# Patient Record
Sex: MALE | Race: WHITE | NOT HISPANIC OR LATINO | Employment: FULL TIME | URBAN - METROPOLITAN AREA
[De-identification: names, ages, dates, MRNs, and addresses within clinical notes are randomized per-mention and may not be internally consistent; named-entity substitution may affect disease eponyms.]

---

## 2019-07-09 ENCOUNTER — OFFICE VISIT (OUTPATIENT)
Dept: FAMILY MEDICINE CLINIC | Facility: CLINIC | Age: 24
End: 2019-07-09
Payer: COMMERCIAL

## 2019-07-09 VITALS
WEIGHT: 171 LBS | SYSTOLIC BLOOD PRESSURE: 116 MMHG | OXYGEN SATURATION: 100 % | DIASTOLIC BLOOD PRESSURE: 74 MMHG | RESPIRATION RATE: 18 BRPM | HEART RATE: 96 BPM

## 2019-07-09 DIAGNOSIS — Z20.2 POSSIBLE EXPOSURE TO STD: Primary | ICD-10-CM

## 2019-07-09 PROCEDURE — 1036F TOBACCO NON-USER: CPT | Performed by: FAMILY MEDICINE

## 2019-07-09 PROCEDURE — 99213 OFFICE O/P EST LOW 20 MIN: CPT | Performed by: FAMILY MEDICINE

## 2019-07-09 NOTE — PROGRESS NOTES
ADULT ANNUAL PHYSICAL  [facility] PRACTICE    NAME: Edgar Flores  AGE: 21 y o  SEX: male  : 1995     DATE: 2019     Assessment and Plan:     Problem List Items Addressed This Visit     None      Visit Diagnoses     Possible exposure to STD    -  Primary    Relevant Orders    Trichomonas vaginalis RNA Qual TMA, Males          Counseling:  Alcohol/drug use: discussed moderation in alcohol intake and avoidance of illicit drug use  Dental Health: discussed importance of regular tooth brushing, flossing, and dental visits  · Sexual health: discussed sexually transmitted diseases, partner selection, use of condoms, avoidance of unintended pregnancy, and contraceptive alternatives  No follow-ups on file  Chief Complaint:     Chief Complaint   Patient presents with    Establish Care      History of Present Illness:     Adult Annual Physical   Patient here for a comprehensive physical exam and to establish care  He has not had an annual physical in >1 year  Patient moved to [address] in  from [facility] and would like to establish care with a PCP  He endorses that his girlfriend was recently seen in the emergency room for possible sexually transmitted disease  He was advised by Dr Antonino Vasquez to come to the office to be tested  Upon review of his girlfriend's chart, she was found to have tested negative for chlamydia/gonorrhea and the results of her Trichomonas testing were lost   She was treated prophylactically with metronidazole, IM ceftriaxone and po Azithromycin  Patient denies any burning on urination, penile discharge and genital lesions  He has no complaints today  Diet and Physical Activity  · Diet/Nutrition: well balanced diet  · Exercise: 5-7 times a week on average and 30-60 minutes on average        Depression Screening  PHQ-9 Depression Screening    PHQ-9:    Frequency of the following problems over the past two weeks: General Health  · Sleep: gets 7-8 hours of sleep on average  · Hearing: normal - bilateral   · Vision: goes for regular eye exams, most recent eye exam <1 year ago and wears glasses and contacts  · Dental: regular dental visits, brushes teeth twice daily and flosses teeth occasionally   Health  · History of STDs?: no      Review of Systems:     Review of Systems   Constitutional: Negative for activity change, appetite change, chills, fatigue and fever  HENT: Negative for congestion, ear pain, rhinorrhea, sinus pressure and sinus pain  Respiratory: Negative for cough, shortness of breath and wheezing  Cardiovascular: Negative for chest pain, palpitations and leg swelling  Gastrointestinal: Negative for abdominal pain, constipation, diarrhea, nausea and vomiting  Endocrine: Negative for heat intolerance, polydipsia and polyuria  Genitourinary: Negative for decreased urine volume, difficulty urinating, discharge, flank pain, penile pain, testicular pain and urgency  Musculoskeletal: Negative for arthralgias, back pain, joint swelling, myalgias, neck pain and neck stiffness  Skin: Negative for color change, rash and wound  Allergic/Immunologic: Negative for environmental allergies and food allergies  Neurological: Negative for dizziness, syncope, weakness, light-headedness, numbness and headaches  Hematological: Does not bruise/bleed easily  Psychiatric/Behavioral: Negative for confusion, decreased concentration, dysphoric mood, sleep disturbance and suicidal ideas  The patient is not nervous/anxious         Past Medical History:     Past Medical History:   Diagnosis Date    Anxiety     Fracture of lower end of both ulna and radius       Past Surgical History:     Past Surgical History:   Procedure Laterality Date    WISDOM TOOTH EXTRACTION N/A N/A       Social History:     Social History     Socioeconomic History    Marital status: Single     Spouse name:     Number of children: 0    Years of education: 12    Highest education level: College   Occupational History       Social Needs    Financial resource strain: None    Food insecurity:     Worry: None     Inability: None    Transportation needs:     Medical: None     Non-medical: None   Tobacco Use    Smoking status: Never smoker   Substance and Sexual Activity    Alcohol use: Social    Drug use: None    Sexual activity: Sexually active with girlfriend   Lifestyle    Physical activity:     Days per week: 5-7     Minutes per session: 60 minutes    Stress: Work, school   Relationships    Social connections:     Talks on phone: Yes     Gets together: Yes     Attends Sikh service: No     Active member of club or organization: No     Attends meetings of clubs or organizations: No     Relationship status: Girlfriend    Intimate partner violence:     Fear of current or ex partner: No     Emotionally abused: No     Physically abused: No     Forced sexual activity: No   Other Topics Concern    Not on file   Social History Narrative    Not on file      Family History:     Family History   Problem Relation Age of Onset    Diabetes Maternal Grandfather     Pancreatic cancer Maternal Grandfather     Heart failure Paternal Grandfather       Current Medications:     No current outpatient medications on file  No current facility-administered medications for this visit  Allergies: Allergies   Allergen Reactions    Ampicillin Hives      Physical Exam:     /74   Pulse 96   Resp 18   Wt 77 6 kg (171 lb)   SpO2 100%     Physical Exam   Constitutional: He is oriented to person, place, and time  He appears well-developed and well-nourished  HENT:   Head: Normocephalic and atraumatic     Right Ear: Tympanic membrane, external ear and ear canal normal    Left Ear: Tympanic membrane, external ear and ear canal normal    Nose: Nose normal    Mouth/Throat: Uvula is midline and oropharynx is clear and moist    Eyes: Pupils are equal, round, and reactive to light  Conjunctivae and EOM are normal    Cardiovascular: Normal rate, regular rhythm, S1 normal, S2 normal and normal pulses  No murmur heard  Pulmonary/Chest: Effort normal and breath sounds normal    Abdominal: Soft  Normal appearance and bowel sounds are normal  There is no tenderness  Neurological: He is alert and oriented to person, place, and time  He has normal strength  He displays normal reflexes  No cranial nerve deficit or sensory deficit  Skin: Skin is warm, dry and intact  Psychiatric: He has a normal mood and affect   His speech is normal and behavior is normal        Milan Shin,    [address]

## 2019-07-15 ENCOUNTER — TELEPHONE (OUTPATIENT)
Dept: FAMILY MEDICINE CLINIC | Facility: CLINIC | Age: 24
End: 2019-07-15

## 2019-07-15 LAB — T VAGINALIS RRNA SPEC QL NAA+PROBE: NEGATIVE

## 2019-07-15 NOTE — TELEPHONE ENCOUNTER
Buddy Tran, I do not see any results in his chart yet  When I get them, I will call patient and let him know

## 2019-07-15 NOTE — TELEPHONE ENCOUNTER
S/w Pt, he would like a call back regarding his lab results that were done on 7/11/19  Ordered by Dr Maximilian Corbett   Pt can be reached at the number listed below;      [phone]

## 2019-07-16 ENCOUNTER — TELEPHONE (OUTPATIENT)
Dept: FAMILY MEDICINE CLINIC | Facility: CLINIC | Age: 24
End: 2019-07-16

## 2019-07-17 ENCOUNTER — TELEPHONE (OUTPATIENT)
Dept: FAMILY MEDICINE CLINIC | Facility: CLINIC | Age: 24
End: 2019-07-17

## 2019-12-22 ENCOUNTER — OFFICE VISIT (OUTPATIENT)
Dept: URGENT CARE | Facility: CLINIC | Age: 24
End: 2019-12-22
Payer: COMMERCIAL

## 2019-12-22 ENCOUNTER — APPOINTMENT (OUTPATIENT)
Dept: RADIOLOGY | Facility: CLINIC | Age: 24
End: 2019-12-22
Payer: COMMERCIAL

## 2019-12-22 VITALS
RESPIRATION RATE: 18 BRPM | SYSTOLIC BLOOD PRESSURE: 157 MMHG | DIASTOLIC BLOOD PRESSURE: 83 MMHG | WEIGHT: 173 LBS | HEART RATE: 78 BPM | OXYGEN SATURATION: 100 % | BODY MASS INDEX: 23.43 KG/M2 | HEIGHT: 72 IN | TEMPERATURE: 99.3 F

## 2019-12-22 DIAGNOSIS — M25.571 ACUTE RIGHT ANKLE PAIN: ICD-10-CM
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
## 2019-12-22 DIAGNOSIS — S92.124A CLOSED NONDISPLACED FRACTURE OF BODY OF RIGHT TALUS, INITIAL ENCOUNTER: Primary | ICD-10-CM

## 2019-12-22 PROCEDURE — 73610 X-RAY EXAM OF ANKLE: CPT

## 2019-12-22 PROCEDURE — 99213 OFFICE O/P EST LOW 20 MIN: CPT | Performed by: PHYSICIAN ASSISTANT

## 2019-12-22 NOTE — PATIENT INSTRUCTIONS
Ankle Fracture   WHAT YOU NEED TO KNOW:   An ankle fracture is a break in 1 or more of the bones in your ankle  DISCHARGE INSTRUCTIONS:   Call 911 for any of the following:   · You feel lightheaded, short of breath, and have chest pain  · You cough up blood  Return to the emergency department if:   · Your leg feels warm, tender, and painful  It may look swollen and red  · Blood soaks through your bandage  · You have severe pain in your ankle  · Your cast feels too tight  · Your foot or toes are cold or numb  · Your foot or toenails turn blue or gray  Contact your healthcare provider if:   · Your splint feels too tight  · Your swelling has increased or returned  · You have a fever  · Your pain does not go away, even after treatment  · You have questions or concerns about your condition or care  Medicines: You may need any of the following:  · Acetaminophen  decreases pain and fever  It is available without a doctor's order  Ask how much to take and how often to take it  Follow directions  Acetaminophen can cause liver damage if not taken correctly  · NSAIDs , such as ibuprofen, help decrease swelling, pain, and fever  This medicine is available with or without a doctor's order  NSAIDs can cause stomach bleeding or kidney problems in certain people  If you take blood thinner medicine, always ask your healthcare provider if NSAIDs are safe for you  Always read the medicine label and follow directions  · Prescription pain medicine  may be given  Ask your healthcare provider how to take this medicine safely  · Take your medicine as directed  Contact your healthcare provider if you think your medicine is not helping or if you have side effects  Tell him or her if you are allergic to any medicine  Keep a list of the medicines, vitamins, and herbs you take  Include the amounts, and when and why you take them  Bring the list or the pill bottles to follow-up visits   Carry your medicine list with you in case of an emergency  Follow up with your healthcare provider in 1 to 2 days: Your fracture may need to be reduced (bones pushed back into place) or you may need surgery  Write down your questions so you remember to ask them during your visits  Support devices: You will be given a brace, cast, or splint to limit your movement and protect your ankle  You may need to use crutches to protect your ankle and decrease your pain as you move around  Do not remove your device and do not put weight on your injured ankle  Splint and cast care:  Cover the splint or cast before you bathe so it does not get wet  Tape 2 plastic trash bags to your skin above the cast  Try to keep your ankle out of the water as much as possible  Rest:  Rest your ankle so that it can heal  Return to normal activities as directed  Ice:  Apply ice on your ankle for 15 to 20 minutes every hour or as directed  Use an ice pack, or put crushed ice in a plastic bag  Cover it with a towel  Ice helps prevent tissue damage and decreases swelling and pain  Elevate:  Elevate your ankle above the level of your heart as often as you can  This will help decrease swelling and pain  Prop your ankle on pillows or blankets to keep it elevated comfortably  © 2017 [employer]  Information is for End User's use only and may not be sold, redistributed or otherwise used for commercial purposes  All illustrations and images included in CareNotes® are the copyrighted property of A [employer] Inc  or [employer]  The above information is an  only  It is not intended as medical advice for individual conditions or treatments  Talk to your doctor, nurse or pharmacist before following any medical regimen to see if it is safe and effective for you

## 2019-12-22 NOTE — PROGRESS NOTES
[facility]        NAME: Alfredo Marie is a 25 y o  male  : 1995    MRN: [id number]  DATE: 2020  TIME: 9:43 AM    Assessment and Plan   Closed nondisplaced fracture of body of right talus, initial encounter [S92 124A]  1  Closed nondisplaced fracture of body of right talus, initial encounter  XR ankle 3+ vw right    Ambulatory referral to Orthopedic Surgery   2  Injury while playing basketball  Ambulatory referral to Orthopedic Surgery         Patient Instructions     Discussed condition with patient  X-ray of the right ankle reveals concern for possible talus fracture so he was placed in a posterior splint and given crutches for ambulation and it is recommended that he avoid weight-bearing on the affected extremity as well as elevate the extremity when nonweightbearing  I recommended NSAIDs for pain and swelling  He will be referred to Orthopedics  Follow up with PCP in 3-5 days  Proceed to  ER if symptoms worsen  Chief Complaint     Chief Complaint   Patient presents with    Ankle Injury     right ankle injury occurred on thursday  Foot appears ecchymotic with swelling noted  Pt ambulated to room          History of Present Illness       Patient presents 3 days after sustaining an injury to his right ankle  He reports he was playing basketball when he sustained the injury and that was a twisting mechanism  He reports swelling and bruising and pain worse with walking weight-bearing  Denies any other area of injury  He has been managing conservatively since the injury occurred      Review of Systems   Review of Systems   Constitutional: Negative  Respiratory: Negative  Cardiovascular: Negative  Gastrointestinal: Negative  Genitourinary: Negative  Musculoskeletal:        Right ankle pain and swelling status post twisting injury         Current Medications     No current outpatient medications on file      Current Allergies     Allergies as of 2019 - Reviewed 12/22/2019   Allergen Reaction Noted    Ampicillin  07/09/2019            The following portions of the patient's history were reviewed and updated as appropriate: allergies, current medications, past family history, past medical history, past social history, past surgical history and problem list      Past Medical History:   Diagnosis Date    Anxiety     Fracture of lower end of both ulna and radius        Past Surgical History:   Procedure Laterality Date    WISDOM TOOTH EXTRACTION N/A        Family History   Problem Relation Age of Onset    Diabetes Maternal Grandfather     Pancreatic cancer Maternal Grandfather     Heart failure Paternal Grandfather          Medications have been verified  Objective   /83   Pulse 78   Temp 99 3 °F (37 4 °C)   Resp 18   Ht 6' (1 829 m)   Wt 78 5 kg (173 lb)   SpO2 100%   BMI 23 46 kg/m²        Physical Exam     Physical Exam   Constitutional: He is oriented to person, place, and time  He appears well-developed and well-nourished  No distress  Musculoskeletal:   Moderate soft tissue swelling of the right ankle joint as well as ecchymosis  Diffuse tenderness to palpation worsened with passive range of motion in all planes  Drawer sign is negative  Remainder foot exam is unremarkable  Neurological: He is alert and oriented to person, place, and time  Psychiatric: He has a normal mood and affect  Vitals reviewed

## 2019-12-24 ENCOUNTER — APPOINTMENT (OUTPATIENT)
Dept: RADIOLOGY | Facility: CLINIC | Age: 24
End: 2019-12-24
Payer: COMMERCIAL

## 2019-12-24 VITALS
WEIGHT: 173 LBS | SYSTOLIC BLOOD PRESSURE: 146 MMHG | HEIGHT: 72 IN | BODY MASS INDEX: 23.43 KG/M2 | HEART RATE: 72 BPM | DIASTOLIC BLOOD PRESSURE: 78 MMHG

## 2019-12-24 DIAGNOSIS — M79.671 PAIN IN RIGHT FOOT: ICD-10-CM

## 2019-12-24 DIAGNOSIS — S93.401A SPRAIN OF UNSPECIFIED LIGAMENT OF RIGHT ANKLE, INITIAL ENCOUNTER: Primary | ICD-10-CM

## 2019-12-24 PROCEDURE — 99203 OFFICE O/P NEW LOW 30 MIN: CPT | Performed by: ORTHOPAEDIC SURGERY

## 2019-12-24 PROCEDURE — 73630 X-RAY EXAM OF FOOT: CPT

## 2019-12-24 NOTE — PROGRESS NOTES
Assessment/Plan:  1  Sprain of unspecified ligament of right ankle, initial encounter     2  Pain in right foot  XR foot 3+ vw right     Scribe Attestation    I,:   Ian Waddell am acting as a scribe while in the presence of the attending physician :        I,:   Sulma Marques, DO personally performed the services described in this documentation    as scribed in my presence :          Miladis Verdugo is a very pleasant 40-year-old male who presents today for initial evaluation of acute right ankle pain  After reviewing his imaging and performing a thorough history and physical exam I explained that he has suffered a sprain to his right ankle  Due to the diffuse and severe swelling present, it is difficult to ascertain the extent of soft tissue injury present  I reviewed his ankle images as well as weight-bearing foot films today and he does not have a fractured ankle or talar injury nor does he have a Lisfranc injury  We will place him in a tall Cam walker boot today and he will continue to use his crutches for weight-bearing as tolerated  I encouraged him to rest for one week and to use ice, compression, elevation to attempt to reduce the swelling in his ankle  He will return in one week for follow-up clinical evaluation and hopefully reduction in his swelling will allow for a more focused evaluation  Subjective:  Initial evaluation for right ankle pain    Patient ID: Michelle Mccallum is a 25 y o  male  HPI  Miladis Verdugo is a pleasant 40-year-old male who presents today with his girlfriend for initial evaluation of right ankle pain  He states that he suffered a forced dorsiflexion injury on 12/19/2019 while playing dodge ball at a bachelor party  He had immediate pain about his ankle and presented to an urgent care for evaluation where he had x-rays which are available for review today  He was placed into a posterior splint and given crutches for assistance with ambulation    At today's visit, he complains of sharp and aching pain about the anterior and lateral aspect of his ankle that increases with attempts at weight-bearing and is better at rest   He also notes significant swelling and bruising about his foot and ankle  He does state that he has a history of right ankle sprains  He denies any distal paresthesias of the lower extremity  Review of Systems   Constitutional: Positive for activity change  Negative for chills, fever and unexpected weight change  HENT: Negative for hearing loss, nosebleeds and sore throat  Eyes: Negative for pain, redness and visual disturbance  Respiratory: Negative for cough, shortness of breath and wheezing  Cardiovascular: Negative for chest pain, palpitations and leg swelling  Gastrointestinal: Negative for abdominal pain, nausea and vomiting  Endocrine: Negative for polyphagia and polyuria  Genitourinary: Negative for dysuria and hematuria  Musculoskeletal:        See HPI   Skin: Negative for rash and wound  Neurological: Negative for dizziness, numbness and headaches  Psychiatric/Behavioral: Negative for decreased concentration and suicidal ideas  The patient is not nervous/anxious  Past Medical History:   Diagnosis Date    Anxiety     Fracture of lower end of both ulna and radius        Past Surgical History:   Procedure Laterality Date    WISDOM TOOTH EXTRACTION N/A        Family History   Problem Relation Age of Onset    Diabetes Maternal Grandfather     Pancreatic cancer Maternal Grandfather     Heart failure Paternal Grandfather        Social History     Occupational History    Not on file   Tobacco Use    Smoking status: Never Smoker    Smokeless tobacco: Never Used   Substance and Sexual Activity    Alcohol use: Not Currently     Frequency: Monthly or less    Drug use: Never    Sexual activity: Never       No current outpatient medications on file      Allergies   Allergen Reactions    Ampicillin        Objective:  Vitals:    12/24/19 0919   BP: 146/78   Pulse: 72       Body mass index is 23 46 kg/m²  Right Ankle Exam     Tenderness   The patient is experiencing tenderness in the ATF and CF  Swelling: severe    Range of Motion   Dorsiflexion: abnormal   Plantar flexion: abnormal     Muscle Strength   Right ankle normal muscle strength: Globally decreased  Other   Erythema: absent  Scars: absent  Sensation: normal  Pulse: present     Comments:  Squeeze: positive  Able to move ankle in all planes with only mild discomfort  No tenderness over sesamoid  Ecchymosis is significant at the forefoot at the toes in the web spaces as well as lateral ankle and medial ankle no ecchymosis in the plantar aspect of his arch            Observations     Right Ankle/Foot   Negative for adhesive scar  Strength/Myotome Testing     Right Ankle/Foot   Right ankle normal muscle strength: Globally decreased  Physical Exam   Constitutional: He is oriented to person, place, and time  He appears well-developed and well-nourished  HENT:   Head: Normocephalic and atraumatic  Eyes: Pupils are equal, round, and reactive to light  Conjunctivae and EOM are normal    Neck: Normal range of motion  Neck supple  Cardiovascular: Normal rate and intact distal pulses  Pulmonary/Chest: Effort normal  No respiratory distress  Abdominal: Soft  Musculoskeletal:   As noted in HPI   Neurological: He is alert and oriented to person, place, and time  Skin: Skin is warm and dry  Psychiatric: He has a normal mood and affect  His behavior is normal    Nursing note and vitals reviewed  I have personally reviewed pertinent films in PACS  X-ray of the right ankle obtained on 12/22/2019 shows no acute fracture, dislocation, lytic or blastic lesion  There is an os trigonum versus a stieda process present  X-ray of the right foot obtained on 12/24/2019 shows no acute fracture, dislocation, lytic or blastic lesion    There is no widening of the Lisfranc complex  There is a bipartite sesamoid noted

## 2020-01-03 ENCOUNTER — OFFICE VISIT (OUTPATIENT)
Dept: OBGYN CLINIC | Facility: CLINIC | Age: 25
End: 2020-01-03
Payer: COMMERCIAL

## 2020-01-03 VITALS
SYSTOLIC BLOOD PRESSURE: 113 MMHG | HEART RATE: 67 BPM | WEIGHT: 173 LBS | BODY MASS INDEX: 23.43 KG/M2 | DIASTOLIC BLOOD PRESSURE: 73 MMHG | HEIGHT: 72 IN

## 2020-01-03 DIAGNOSIS — S93.401D SPRAIN OF RIGHT ANKLE, UNSPECIFIED LIGAMENT, SUBSEQUENT ENCOUNTER: Primary | ICD-10-CM

## 2020-01-03 DIAGNOSIS — M79.671 PAIN IN RIGHT FOOT: ICD-10-CM

## 2020-01-03 PROCEDURE — 99214 OFFICE O/P EST MOD 30 MIN: CPT | Performed by: ORTHOPAEDIC SURGERY

## 2020-01-03 NOTE — PROGRESS NOTES
Assessment/Plan:  1  Sprain of right ankle, unspecified ligament, subsequent encounter     2  Pain in right foot       Scribe Attestation    I,:   Mark Mandujano am acting as a scribe while in the presence of the attending physician :        I,:   Tracie Caraballo, DO personally performed the services described in this documentation    as scribed in my presence :          Perla Gregg is a very pleasant 25 old male who presents today for follow-up evaluation of his right ankle  He is now two weeks status post injury  I'm very pleased with his clinical presentation today in the office  I advised him to continue his relative rest for an additional week  He will discontinue use of his tall Cam walker boot at this time  We will fit him with a lace-up ankle brace in the office today and he may use this to weight bear as tolerated  He will continue to use ice and compression for conservative treatment  I explained that if he continues to experience persistent pain in four weeks, he should present to the office for follow-up evaluation and we will consider an MRI to evaluate for internal derangement  Subjective: Follow-up evaluation right ankle    Patient ID: Da Noonan is a 25 y o  male  [facility]  Vik Metzger is a very pleasant 80-year-old male who presents today with his girlfriend for follow-up evaluation of his right ankle  He sustained a traumatic sprain on 12/19/2019  At his last visit, he was placed into a tall Cam walker boot and instructed to present in one week for follow-up evaluation after reduction in his acute swelling  He has been using his Cam boot for ambulation and states that he is feeling much better  He notes a significant reduction in his swelling and also improvement in his bruising  He does continue to experience a mild and sharp pain that is position dependent  He mostly notices this with plantar flexion  He denies any new injury or trauma    He denies any distal paresthesias of the lower extremity  Review of Systems   Constitutional: Positive for activity change  Negative for chills, fever and unexpected weight change  HENT: Negative for hearing loss, nosebleeds and sore throat  Eyes: Negative for pain, redness and visual disturbance  Respiratory: Negative for cough, shortness of breath and wheezing  Cardiovascular: Negative for chest pain, palpitations and leg swelling  Gastrointestinal: Negative for abdominal pain, nausea and vomiting  Endocrine: Negative for polyphagia and polyuria  Genitourinary: Negative for dysuria and hematuria  Musculoskeletal: Positive for arthralgias and joint swelling  Negative for myalgias  See HPI   Skin: Negative for rash and wound  Neurological: Negative for dizziness, numbness and headaches  Psychiatric/Behavioral: Negative for decreased concentration and suicidal ideas  The patient is not nervous/anxious  Past Medical History:   Diagnosis Date    Anxiety     Fracture of lower end of both ulna and radius        Past Surgical History:   Procedure Laterality Date    WISDOM TOOTH EXTRACTION N/A        Family History   Problem Relation Age of Onset    Diabetes Maternal Grandfather     Pancreatic cancer Maternal Grandfather     Heart failure Paternal Grandfather        Social History     Occupational History    Not on file   Tobacco Use    Smoking status: Never Smoker    Smokeless tobacco: Never Used   Substance and Sexual Activity    Alcohol use: Not Currently     Frequency: Monthly or less    Drug use: Never    Sexual activity: Never       No current outpatient medications on file  Allergies   Allergen Reactions    Ampicillin        Objective:  Vitals:    01/03/20 0833   BP: 113/73   Pulse: 67       Body mass index is 23 46 kg/m²  Right Ankle Exam     Tenderness   The patient is experiencing tenderness in the ATF and CF    Swelling: mild    Range of Motion   Dorsiflexion: normal   Plantar flexion: normal Eversion: normal   Inversion: normal     Tests   Anterior drawer: negative    Other   Erythema: absent  Scars: absent  Sensation: normal  Pulse: present     Comments:  No tenderness over tarsometatarsal joints  Resolving ecchymosis about the toes  Anterolateral pain with passive plantarflexion  Ankle stable on exam          Observations     Right Ankle/Foot   Negative for adhesive scar  Physical Exam   Constitutional: He is oriented to person, place, and time  He appears well-developed and well-nourished  HENT:   Head: Normocephalic and atraumatic  Eyes: Pupils are equal, round, and reactive to light  Conjunctivae and EOM are normal    Neck: Normal range of motion  Neck supple  Cardiovascular: Normal rate and intact distal pulses  Pulmonary/Chest: Effort normal  No respiratory distress  Abdominal: Soft  Musculoskeletal:   As noted in HPI   Neurological: He is alert and oriented to person, place, and time  Skin: Skin is warm and dry  Psychiatric: He has a normal mood and affect  His behavior is normal    Nursing note and vitals reviewed

## 2020-01-31 ENCOUNTER — OFFICE VISIT (OUTPATIENT)
Dept: OBGYN CLINIC | Facility: CLINIC | Age: 25
End: 2020-01-31
Payer: COMMERCIAL

## 2020-01-31 VITALS
BODY MASS INDEX: 23.84 KG/M2 | DIASTOLIC BLOOD PRESSURE: 75 MMHG | SYSTOLIC BLOOD PRESSURE: 124 MMHG | HEIGHT: 72 IN | WEIGHT: 176 LBS | HEART RATE: 72 BPM

## 2020-01-31 DIAGNOSIS — S93.401D MODERATE RIGHT ANKLE SPRAIN, SUBSEQUENT ENCOUNTER: Primary | ICD-10-CM

## 2020-01-31 PROCEDURE — 3008F BODY MASS INDEX DOCD: CPT | Performed by: ORTHOPAEDIC SURGERY

## 2020-01-31 PROCEDURE — 99213 OFFICE O/P EST LOW 20 MIN: CPT | Performed by: ORTHOPAEDIC SURGERY

## 2020-01-31 NOTE — PROGRESS NOTES
Assessment/Plan:  1  Moderate right ankle sprain, subsequent encounter       Terrance Noe is a very pleasant 51-year-old active gentleman presenting for follow-up 6 weeks after sustaining a right ankle sprain  He has done very well in his recovery thus far  We discussed that he may return to activities of daily living and enjoyment at the gym in a slow progressive fashion  He should continue working range-of-motion exercises and may wean out of the ankle brace as he sees fit  We agreed to defer formal physical therapy at this time  He can call and return in 6 weeks if his symptoms persist   Otherwise, he will return as needed  All of his questions were addressed  Subjective: Right ankle follow up    Patient ID: Dania Reid is a 25 y o  male  Terrance Noe is a pleasant 51-year-old gentleman presenting 6 weeks after sustaining a right ankle sprain  He has transition out of the Cam walker boot and is now in the ankle brace  He has been performing range of motion exercises and getting back to the gym  He has attempted light jogs without significant difficulty  He occasionally has some lateral soreness, but overall is pleased with his progress  He denies new injuries or symptoms        Review of Systems   Constitutional: Negative  HENT: Negative  Eyes: Negative  Respiratory: Negative  Cardiovascular: Negative  Gastrointestinal: Negative  Endocrine: Negative  Genitourinary: Negative  Musculoskeletal: Positive for joint swelling  Skin: Negative  Allergic/Immunologic: Negative  Neurological: Negative  Hematological: Negative  Psychiatric/Behavioral: Negative            Past Medical History:   Diagnosis Date    Anxiety     Fracture of lower end of both ulna and radius        Past Surgical History:   Procedure Laterality Date    WISDOM TOOTH EXTRACTION N/A        Family History   Problem Relation Age of Onset    Diabetes Maternal Grandfather     Pancreatic cancer Maternal Grandfather     Heart failure Paternal Grandfather        Social History     Occupational History    Not on file   Tobacco Use    Smoking status: Never Smoker    Smokeless tobacco: Never Used   Substance and Sexual Activity    Alcohol use: Not Currently     Frequency: Monthly or less    Drug use: Never    Sexual activity: Never       No current outpatient medications on file  Allergies   Allergen Reactions    Ampicillin        Objective:  Vitals:    01/31/20 0831   BP: 124/75   Pulse: 72       Body mass index is 23 87 kg/m²  Right Ankle Exam     Tenderness   The patient is experiencing no tenderness  Swelling: none    Range of Motion   Dorsiflexion:  20 normal   Plantar flexion:  35 normal   Eversion:  15 normal   Inversion:  35 normal     Muscle Strength   Dorsiflexion:  5/5  Plantar flexion:  5/5  Anterior tibial:  5/5  Posterior tibial:  5/5  Gastrocsoleus:  5/5  Peroneal muscle:  5/5    Tests   Anterior drawer: negative    Other   Erythema: absent  Scars: absent  Sensation: normal  Pulse: present     Comments:  No tenderness over tarsometatarsal joints  Edema resolved  Able to perform PROM and AROM without discomfort  Ankle stable on exam  Grossly NVI          Observations     Right Ankle/Foot   Negative for adhesive scar  Strength/Myotome Testing     Right Ankle/Foot   Dorsiflexion: 5  Plantar flexion: 5      Physical Exam   Constitutional: He is oriented to person, place, and time  He appears well-developed and well-nourished  Body mass index is 23 87 kg/m²  HENT:   Head: Normocephalic and atraumatic  Eyes: EOM are normal    Neck: Normal range of motion  Cardiovascular: Intact distal pulses  Pulmonary/Chest: Effort normal    Musculoskeletal:   See ortho exam   Neurological: He is alert and oriented to person, place, and time  Skin: Skin is warm and dry  Psychiatric: He has a normal mood and affect   His behavior is normal  Judgment and thought content normal    Nursing note and vitals reviewed

## 2022-07-27 ENCOUNTER — TELEPHONE (OUTPATIENT)
Dept: FAMILY MEDICINE CLINIC | Facility: CLINIC | Age: 27
End: 2022-07-27

## 2022-08-11 NOTE — TELEPHONE ENCOUNTER
Called patient and left voicemail to give us a call back in regards to PCP    Have not seen patient in over three years      Care Gap: Please remove Dr Erin Gonzalez as PCP

## 2022-08-30 NOTE — TELEPHONE ENCOUNTER
Certified letter received back at office(return to sender)    Called 3 times, sent certified letter, has no been seen in 3+years    Please removed Dr Sophia Novak as PCP

## 2022-09-14 NOTE — TELEPHONE ENCOUNTER
09/14/22 4:31 PM     Thank you for your request  Your request has been received, reviewed, and the patient chart updated  The PCP has successfully been removed with a patient attribution note  This message will now be completed      Thank you  Herberth Syed